# Patient Record
Sex: MALE | Race: WHITE | Employment: FULL TIME | URBAN - METROPOLITAN AREA
[De-identification: names, ages, dates, MRNs, and addresses within clinical notes are randomized per-mention and may not be internally consistent; named-entity substitution may affect disease eponyms.]

---

## 2019-10-08 ENCOUNTER — OFFICE VISIT (OUTPATIENT)
Dept: CHIROPRACTIC MEDICINE | Age: 34
End: 2019-10-08
Payer: COMMERCIAL

## 2019-10-08 VITALS — HEART RATE: 93 BPM | SYSTOLIC BLOOD PRESSURE: 120 MMHG | DIASTOLIC BLOOD PRESSURE: 82 MMHG | OXYGEN SATURATION: 97 %

## 2019-10-08 DIAGNOSIS — M54.41 ACUTE RIGHT-SIDED LOW BACK PAIN WITH RIGHT-SIDED SCIATICA: Primary | ICD-10-CM

## 2019-10-08 PROCEDURE — 98940 CHIROPRACT MANJ 1-2 REGIONS: CPT | Performed by: CHIROPRACTOR

## 2019-10-08 PROCEDURE — 4004F PT TOBACCO SCREEN RCVD TLK: CPT | Performed by: CHIROPRACTOR

## 2019-10-08 PROCEDURE — G8428 CUR MEDS NOT DOCUMENT: HCPCS | Performed by: CHIROPRACTOR

## 2019-10-08 PROCEDURE — 99203 OFFICE O/P NEW LOW 30 MIN: CPT | Performed by: CHIROPRACTOR

## 2019-10-08 PROCEDURE — G8421 BMI NOT CALCULATED: HCPCS | Performed by: CHIROPRACTOR

## 2019-10-08 PROCEDURE — 97014 ELECTRIC STIMULATION THERAPY: CPT | Performed by: CHIROPRACTOR

## 2019-10-08 PROCEDURE — G8484 FLU IMMUNIZE NO ADMIN: HCPCS | Performed by: CHIROPRACTOR

## 2019-10-08 ASSESSMENT — ENCOUNTER SYMPTOMS: BACK PAIN: 1

## 2019-10-15 ENCOUNTER — OFFICE VISIT (OUTPATIENT)
Dept: CHIROPRACTIC MEDICINE | Age: 34
End: 2019-10-15
Payer: COMMERCIAL

## 2019-10-15 DIAGNOSIS — M54.41 ACUTE RIGHT-SIDED LOW BACK PAIN WITH RIGHT-SIDED SCIATICA: Primary | ICD-10-CM

## 2019-10-15 PROCEDURE — 98940 CHIROPRACT MANJ 1-2 REGIONS: CPT | Performed by: CHIROPRACTOR

## 2019-10-15 PROCEDURE — 97014 ELECTRIC STIMULATION THERAPY: CPT | Performed by: CHIROPRACTOR

## 2022-06-17 ENCOUNTER — HOSPITAL ENCOUNTER (EMERGENCY)
Age: 37
Discharge: HOME | End: 2022-06-17
Payer: COMMERCIAL

## 2022-06-17 VITALS
TEMPERATURE: 98.5 F | HEIGHT: 77 IN | SYSTOLIC BLOOD PRESSURE: 138 MMHG | HEART RATE: 87 BPM | WEIGHT: 315 LBS | OXYGEN SATURATION: 96 % | RESPIRATION RATE: 16 BRPM | BODY MASS INDEX: 37.19 KG/M2 | DIASTOLIC BLOOD PRESSURE: 90 MMHG

## 2022-06-17 DIAGNOSIS — H16.002 CORNEAL ULCER OF LEFT EYE: Primary | ICD-10-CM

## 2022-06-17 PROCEDURE — 99283 EMERGENCY DEPT VISIT LOW MDM: CPT

## 2022-06-17 RX ORDER — TOBRAMYCIN 3 MG/ML
2 SOLUTION/ DROPS OPHTHALMIC EVERY 4 HOURS
Qty: 5 ML | Refills: 0 | Status: SHIPPED | OUTPATIENT
Start: 2022-06-17 | End: 2022-06-24

## 2022-06-17 ASSESSMENT — PAIN DESCRIPTION - ORIENTATION: ORIENTATION: LEFT

## 2022-06-17 ASSESSMENT — PAIN - FUNCTIONAL ASSESSMENT: PAIN_FUNCTIONAL_ASSESSMENT: 0-10

## 2022-06-17 ASSESSMENT — PAIN DESCRIPTION - LOCATION: LOCATION: EYE

## 2022-06-17 ASSESSMENT — VISUAL ACUITY: OU: 70/20

## 2022-06-17 ASSESSMENT — PAIN SCALES - GENERAL: PAINLEVEL_OUTOF10: 7

## 2022-06-17 NOTE — ED NOTES
Discharge instructions given, medications and follow up instructions reviewed. Patient verbalized understanding, no other noted or stated problems at this time. Patient will follow up with physicians as directed.         Alice Rivera RN  06/17/22 [id number]

## 2022-06-17 NOTE — ED PROVIDER NOTES
[facility]  Department of Emergency Medicine   ED  Encounter Note  Admit Date/RoomTime: 2022  8:41 AM  ED Room:     NAME: Greta Faith  : 1985  MRN: [id number]     Chief Complaint:  Eye Pain (left eye pain, irritation, and pressure since 0300)    History of Present Illness        Greta Faith is a 39 y.o. old male presenting to the emergency department by private vehicle, for sudden onset, persistent foreign body sensation, pain, photophobia and tearing to left eye, which began 1 day(s) prior to arrival.  Patient states he wears daily contacts which are removed at night, cleansed and then reused for a period of 30 days and then thrown away. He does have glasses. He noted that his eye became painful the other day, removed his contacts thinking that there was something on it and has not had contacts and ever since and complains of persistent pain still. Circumstances:    [x]  Contact Lens Use     []  Recent URI Sx's     []  Spontaneous Onset     []  Close Contact w/similar Sx's     []  Work Related     History of:     []   Glaucoma     []   Recent Eye Surgery     ROS   Pertinent positives and negatives are stated within HPI, all other systems reviewed and are negative. Past Medical History:  has no past medical history on file. Surgical History:  has a past surgical history that includes Tonsillectomy. Social History:  reports that he has never smoked. His smokeless tobacco use includes chew. He reports that he does not drink alcohol and does not use drugs. Family History: family history is not on file. Allergies: Patient has no known allergies.     Physical Exam   Oxygen Saturation Interpretation: Normal.        ED Triage Vitals [22 0839]   BP Temp Temp Source Heart Rate Resp SpO2 Height Weight   (!) 138/90 98.5 °F (36.9 °C) Oral 87 16 96 % 6' 5\" (1.956 m) (!) 350 lb (158.8 kg)         Constitutional:  Alert, development consistent with age. HENT:  NC/NT. Airway patent. Neck:  Normal ROM. Supple. Eyes:         Pupils: equal, round, reactive to light and accommodation. Eyelids: Bilateral upper and lower Swelling/redness:  no swelling or erythema. Conjunctiva: Left injected(red). Sclera: Bilateral normal appearing. Cornea: Left a less than 1 mm foreign body with the appearance of metal centrally. EOM:  Intact Bilaterally. Fundoscopic:  not well visualized. Visual Acuity:  Glasses. Integument:  No rashes, erythema present, unless noted elsewhere. Lymphatics: No lymphangitis or adenopathy noted. Neurological:  Oriented. Motor functions intact. Lab / Imaging Results   (All laboratory and radiology results have been personally reviewed by myself)  Labs:  No results found for this visit on 06/17/22. Imaging: All Radiology results interpreted by Radiologist unless otherwise noted. No orders to display       ED Course / Medical Decision Making     Medications   fluorescein 0.6 MG ophthalmic strip (has no administration in time range)        Re-examination:  6/17/22       Time: 0945  Patients condition is improving after treatment. Consult(s):   None    Procedure(s):  SLIT LAMP EXAM:  Performed By: KATHERINE Miller. Left Eye: Cornea: a small early corneal ulcer was observed just off. Flourescein stain: Positive. Anterior chamber: normal.         Plan of Care/Counseling:  KATHERINE Miller reviewed today's visit with the patient in addition to providing specific details for the plan of care and counseling regarding the diagnosis and prognosis. Examination reveals no obvious retained foreign body although at first glance that is how it appeared. Under magnification this is definitely an early ulceration of the cornea. Patient was encouraged not to wear any contact lenses for period of at least 1 week.   Use the eyedrops as directed and he will be provided an ophthalmology follow-up if needed. Questions are answered at this time and are agreeable with the plan. Assessment      1. Corneal ulcer of left eye      Plan   Discharged home. Patient condition is good    New Medications     New Prescriptions    TOBRAMYCIN (TOBREX) 0.3 % OPHTHALMIC SOLUTION    Place 2 drops into the left eye every 4 hours for 7 days     Electronically signed by KATHERINE Braden   DD: 6/17/22  **This report was transcribed using voice recognition software. Every effort was made to ensure accuracy; however, inadvertent computerized transcription errors may be present.   [facility] OF ED PROVIDER NOTE       Merlin Graver, [address]  06/17/22 [id number]

## 2023-02-22 LAB
ALBUMIN SERPL-MCNC: 4.5 G/DL
ALP BLD-CCNC: 92 U/L
ALT SERPL-CCNC: 40 U/L
ANION GAP SERPL CALCULATED.3IONS-SCNC: 12 MMOL/L
AST SERPL-CCNC: 22 U/L
BILIRUB SERPL-MCNC: 0.6 MG/DL (ref 0.1–1.4)
BUN BLDV-MCNC: 19 MG/DL
CALCIUM SERPL-MCNC: 9.1 MG/DL
CHLORIDE BLD-SCNC: 104 MMOL/L
CHOLESTEROL, TOTAL: 16 MG/DL
CHOLESTEROL/HDL RATIO: 3.1
CO2: 23 MMOL/L
CREAT SERPL-MCNC: 0.99 MG/DL
EGFR: 100
GLUCOSE BLD-MCNC: 111 MG/DL
HDLC SERPL-MCNC: 39 MG/DL (ref 35–70)
LDL CHOLESTEROL CALCULATED: 119 MG/DL (ref 0–160)
NONHDLC SERPL-MCNC: 4.5 MG/DL
POTASSIUM SERPL-SCNC: 4.5 MMOL/L
SODIUM BLD-SCNC: 139 MMOL/L
TOTAL PROTEIN: 7.1
TRIGL SERPL-MCNC: 92 MG/DL
VLDLC SERPL CALC-MCNC: 18 MG/DL

## 2023-07-21 ENCOUNTER — OFFICE VISIT (OUTPATIENT)
Dept: NEUROLOGY | Age: 38
End: 2023-07-21
Payer: COMMERCIAL

## 2023-07-21 VITALS
HEIGHT: 77 IN | TEMPERATURE: 97.6 F | HEART RATE: 86 BPM | OXYGEN SATURATION: 97 % | BODY MASS INDEX: 37.19 KG/M2 | WEIGHT: 315 LBS | DIASTOLIC BLOOD PRESSURE: 81 MMHG | SYSTOLIC BLOOD PRESSURE: 130 MMHG

## 2023-07-21 DIAGNOSIS — M79.602 PAIN IN BOTH UPPER EXTREMITIES: ICD-10-CM

## 2023-07-21 DIAGNOSIS — M15.8 OTHER OSTEOARTHRITIS INVOLVING MULTIPLE JOINTS: ICD-10-CM

## 2023-07-21 DIAGNOSIS — R20.0 NUMBNESS AND TINGLING OF BOTH UPPER EXTREMITIES: ICD-10-CM

## 2023-07-21 DIAGNOSIS — R20.2 PARESTHESIAS: Primary | ICD-10-CM

## 2023-07-21 DIAGNOSIS — M79.601 PAIN IN BOTH UPPER EXTREMITIES: ICD-10-CM

## 2023-07-21 DIAGNOSIS — R20.2 NUMBNESS AND TINGLING OF BOTH UPPER EXTREMITIES: ICD-10-CM

## 2023-07-21 PROCEDURE — 4004F PT TOBACCO SCREEN RCVD TLK: CPT | Performed by: NURSE PRACTITIONER

## 2023-07-21 PROCEDURE — 99204 OFFICE O/P NEW MOD 45 MIN: CPT | Performed by: NURSE PRACTITIONER

## 2023-07-21 PROCEDURE — G8417 CALC BMI ABV UP PARAM F/U: HCPCS | Performed by: NURSE PRACTITIONER

## 2023-07-21 PROCEDURE — G8427 DOCREV CUR MEDS BY ELIG CLIN: HCPCS | Performed by: NURSE PRACTITIONER

## 2023-07-21 RX ORDER — GABAPENTIN 300 MG/1
300 CAPSULE ORAL 3 TIMES DAILY
Qty: 90 CAPSULE | Refills: 0 | Status: SHIPPED | OUTPATIENT
Start: 2023-07-21 | End: 2023-08-20

## 2023-07-21 NOTE — PROGRESS NOTES
[address] Jyoti Ashley M.D., F.A.C.P. Yeison Malone, JAMARCUS, APRN, CNS  Dorian Setting. Mechelle Hauser, MSN, APRN-FNP-C  Alex Carlin MSN, APRN, FNP-C  Patti Bumpers MSPAS, PA-C  [address] [facility] MSN, APRN, FNP-C  Naeem Foote, MSN, APRN, FNP-BC  [address]  Phone: [phone]  Fax: [phone]       Lana Pappas is a 44 y.o. right handed male     Patient presents to neurology clinic today for paresthesias, numbness and tingling    Patient presents alone and is deemed a good historian    Past Medical History:     Past Medical History:   Diagnosis Date    Arthritis     Gout        Past Surgical History:       Past Surgical History:   Procedure Laterality Date    TONSILLECTOMY         Allergies:       Patient has no known allergies. Medications:     Prior to Admission medications    Medication Sig Start Date End Date Taking? Authorizing Provider   gabapentin (NEURONTIN) 300 MG capsule Take 1 capsule by mouth 3 times daily for 30 days. 7/21/23 8/20/23 Yes YUSRA Devlin - KETTY   predniSONE (DELTASONE) 20 MG tablet Take 20 mg by mouth daily  Patient not taking: Reported on 7/21/2023    Historical Provider, MD   allopurinol (ZYLOPRIM) 100 MG tablet Take 100 mg by mouth daily  Patient not taking: Reported on 7/21/2023    Historical Provider, MD   hydrocortisone 1 % cream Apply topically 2 times daily Apply topically 2 times daily. Patient not taking: Reported on 7/21/2023    Historical Provider, MD   Multiple Vitamins TABS Take 1 tablet by mouth daily  Patient not taking: Reported on 7/21/2023    Historical Provider, MD       Social History:        reports that he has never smoked. His smokeless tobacco use includes chew. He reports that he does not drink alcohol and does not use drugs. Patient is  and has 2 daughters as well as custody of his nephew. Patient works as a  here locally.     Family History:     No

## 2023-09-05 ENCOUNTER — HOSPITAL ENCOUNTER (OUTPATIENT)
Dept: MRI IMAGING | Age: 38
Discharge: HOME | End: 2023-09-07
Payer: COMMERCIAL

## 2023-09-05 DIAGNOSIS — R20.2 PARESTHESIAS: ICD-10-CM

## 2023-09-05 PROCEDURE — 6360000004 HC RX CONTRAST MEDICATION: Performed by: RADIOLOGY

## 2023-09-05 PROCEDURE — A9579 GAD-BASE MR CONTRAST NOS,1ML: HCPCS | Performed by: RADIOLOGY

## 2023-09-05 PROCEDURE — 72156 MRI NECK SPINE W/O & W/DYE: CPT

## 2023-09-05 RX ADMIN — GADOTERIDOL 20 ML: 279.3 INJECTION, SOLUTION INTRAVENOUS at 18:40

## 2023-09-19 DIAGNOSIS — R20.2 PARESTHESIAS: ICD-10-CM

## 2023-09-19 LAB
ABSOLUTE IMMATURE GRANULOCYTE: <0.03 K/UL (ref 0–0.58)
BASOPHILS ABSOLUTE: 0.06 K/UL (ref 0–0.2)
BASOPHILS RELATIVE PERCENT: 1 % (ref 0–2)
EOSINOPHILS ABSOLUTE: 0.05 K/UL (ref 0.05–0.5)
EOSINOPHILS RELATIVE PERCENT: 1 % (ref 0–6)
HCT VFR BLD CALC: 45 % (ref 37–54)
HEMOGLOBIN: 14.8 G/DL (ref 12.5–16.5)
IMMATURE GRANULOCYTES: 0 % (ref 0–5)
LYMPHOCYTES ABSOLUTE: 1.93 K/UL (ref 1.5–4)
LYMPHOCYTES RELATIVE PERCENT: 34 % (ref 20–42)
MCH RBC QN AUTO: 29.8 PG (ref 26–35)
MCHC RBC AUTO-ENTMCNC: 32.9 G/DL (ref 32–34.5)
MCV RBC AUTO: 90.7 FL (ref 80–99.9)
MONOCYTES ABSOLUTE: 0.48 K/UL (ref 0.1–0.95)
MONOCYTES RELATIVE PERCENT: 9 % (ref 2–12)
NEUTROPHILS ABSOLUTE: 3.1 K/UL (ref 1.8–7.3)
NEUTROPHILS RELATIVE PERCENT: 55 % (ref 43–80)
PDW BLD-RTO: 13.2 % (ref 11.5–15)
PLATELET # BLD: 223 K/UL (ref 130–450)
PMV BLD AUTO: 10.7 FL (ref 7–12)
RBC # BLD: 4.96 M/UL (ref 3.8–5.8)
WBC # BLD: 5.6 K/UL (ref 4.5–11.5)

## 2023-09-20 LAB
ALBUMIN (CALCULATED): 3.8 G/DL (ref 3.5–4.7)
ALPHA-1-GLOBULIN: 0.3 G/DL (ref 0.2–0.4)
ALPHA-2-GLOBULIN: 0.6 G/DL (ref 0.5–1)
BETA GLOBULIN: 1.1 G/DL (ref 0.8–1.3)
FERRITIN: 575 NG/ML
FOLATE: 12.4 NG/ML (ref 4.8–24.2)
GAMMA GLOBULIN: 1.3 G/DL (ref 0.7–1.6)
IRON SATURATION: 24 % (ref 20–55)
IRON: 68 UG/DL (ref 59–158)
PATHOLOGIST: NORMAL
PROTEIN ELECTROPHORESIS, SERUM: NORMAL
TOTAL IRON BINDING CAPACITY: 284 UG/DL (ref 250–450)
TOTAL PROTEIN: 7.1 G/DL (ref 6.4–8.3)
TSH SERPL DL<=0.05 MIU/L-ACNC: 1.59 UIU/ML (ref 0.27–4.2)
VITAMIN B-12: 585 PG/ML (ref 211–946)
VITAMIN D 25-HYDROXY: 21.8 NG/ML (ref 30–100)

## 2023-09-24 LAB — VITAMIN B1 WHOLE BLOOD: 125 NMOL/L (ref 70–180)

## 2023-09-25 LAB — VITAMIN B6: 51.3 NMOL/L (ref 20–125)

## 2023-09-29 ENCOUNTER — TELEPHONE (OUTPATIENT)
Dept: NEUROLOGY | Age: 38
End: 2023-09-29

## 2023-09-29 NOTE — TELEPHONE ENCOUNTER
----- Message from YUSRA Gaming NP sent at 9/25/2023  1:48 PM EDT -----  Please notify patient that their lab results were normal with the exception of a low vitamin D.  Vitamin D supplementation should be started.

## 2023-09-29 NOTE — TELEPHONE ENCOUNTER
----- Message from YUSRA Long NP sent at 9/8/2023  1:50 PM EDT -----  Please notify patient that their MRI cervical spine results are normal.

## 2024-01-22 ENCOUNTER — OFFICE VISIT (OUTPATIENT)
Dept: NEUROLOGY | Age: 39
End: 2024-01-22
Payer: COMMERCIAL

## 2024-01-22 VITALS
SYSTOLIC BLOOD PRESSURE: 139 MMHG | OXYGEN SATURATION: 99 % | TEMPERATURE: 98.8 F | WEIGHT: 315 LBS | HEART RATE: 80 BPM | BODY MASS INDEX: 37.19 KG/M2 | HEIGHT: 77 IN | DIASTOLIC BLOOD PRESSURE: 88 MMHG

## 2024-01-22 DIAGNOSIS — M62.838 MUSCLE SPASM: ICD-10-CM

## 2024-01-22 DIAGNOSIS — R20.0 NUMBNESS AND TINGLING OF BOTH UPPER EXTREMITIES: ICD-10-CM

## 2024-01-22 DIAGNOSIS — E55.9 VITAMIN D DEFICIENCY: ICD-10-CM

## 2024-01-22 DIAGNOSIS — R20.2 NUMBNESS AND TINGLING OF BOTH UPPER EXTREMITIES: ICD-10-CM

## 2024-01-22 DIAGNOSIS — R20.2 PARESTHESIAS: Primary | ICD-10-CM

## 2024-01-22 PROCEDURE — G8484 FLU IMMUNIZE NO ADMIN: HCPCS | Performed by: NURSE PRACTITIONER

## 2024-01-22 PROCEDURE — 4004F PT TOBACCO SCREEN RCVD TLK: CPT | Performed by: NURSE PRACTITIONER

## 2024-01-22 PROCEDURE — 99213 OFFICE O/P EST LOW 20 MIN: CPT | Performed by: NURSE PRACTITIONER

## 2024-01-22 PROCEDURE — G8417 CALC BMI ABV UP PARAM F/U: HCPCS | Performed by: NURSE PRACTITIONER

## 2024-01-22 PROCEDURE — G8427 DOCREV CUR MEDS BY ELIG CLIN: HCPCS | Performed by: NURSE PRACTITIONER

## 2024-01-22 RX ORDER — CHOLECALCIFEROL (VITAMIN D3) 50 MCG
2000 TABLET ORAL DAILY
Qty: 90 TABLET | Refills: 0 | Status: SHIPPED | OUTPATIENT
Start: 2024-01-22 | End: 2024-04-21

## 2024-01-22 RX ORDER — TIZANIDINE 4 MG/1
4 TABLET ORAL NIGHTLY
Qty: 30 TABLET | Refills: 0 | Status: SHIPPED | OUTPATIENT
Start: 2024-01-22 | End: 2024-02-21

## 2024-01-22 NOTE — PROGRESS NOTES
facial muscle function - upper  Normal   VII: facial muscle function - lower Normal   VIII: hearing Normal   IX: soft palate elevation  Normal   IX,X: gag reflex    XI: trapezius strength  5/5   XI: sternocleidomastoid strength 5/5   XI: neck extension strength  5/5   XII: tongue strength  Normal     Motor:  5/5 bilateral handgrips  5/5 throughout  Normal bulk and tone  No drift   No abnormal movements    Sensory:  LT and PP normal  Vibration normal    (-) Tinel's and Phalen's again today     Coordination:   FN, FFM and MASSIEL normal  HS normal    Gait:  Normal  Walks well on toes, heels, and tandem    DTR:   Right Brachioradialis reflex 0  Left Brachioradialis reflex 0  Right Biceps reflex 0  Left Biceps reflex 0  Right Triceps reflex 0  Left Triceps reflex 0  Right Quadriceps reflex 0  Left Quadriceps reflex 0  Right Achilles reflex 0  Left Achilles reflex 0    ?L Babinskis  No Reyes's    No other pathological reflexes    Laboratory/Radiology:     Lab Results   Component Value Date    WBC 5.6 09/19/2023    HGB 14.8 09/19/2023    HCT 45.0 09/19/2023    MCV 90.7 09/19/2023     09/19/2023    LYMPHOPCT 34 09/19/2023    RBC 4.96 09/19/2023    MCH 29.8 09/19/2023    MCHC 32.9 09/19/2023    RDW 13.2 09/19/2023     Lab Results   Component Value Date     02/22/2023    K 4.5 02/22/2023     02/22/2023    CO2 23 02/22/2023    BUN 19 02/22/2023    CREATININE 0.99 02/22/2023    GLUCOSE 111 02/22/2023    CALCIUM 9.1 02/22/2023    PROT 7.1 09/19/2023    LABALBU 4.5 02/22/2023    BILITOT 0.6 02/22/2023    ALKPHOS 92 02/22/2023    AST 22 02/22/2023    ALT 40 02/22/2023    LABGLOM 100 02/22/2023     Lab Results   Component Value Date    TSH 1.59 09/19/2023      Lab Results   Component Value Date    RUHDITBK35 585 09/19/2023      Lab Results   Component Value Date    FOLATE 12.4 09/19/2023      MRI cervical spine with and without contrast 9/6/2023:  1. Mild degenerative change.  Mild bilateral C3-4 and left C4-5

## 2025-05-29 ENCOUNTER — HOSPITAL ENCOUNTER (OUTPATIENT)
Dept: GENERAL RADIOLOGY | Age: 40
Discharge: HOME | End: 2025-05-31
Payer: COMMERCIAL

## 2025-05-29 ENCOUNTER — TRANSCRIBE ORDERS (OUTPATIENT)
Dept: GENERAL RADIOLOGY | Age: 40
End: 2025-05-29

## 2025-05-29 DIAGNOSIS — R05.3 CHRONIC COUGH: Primary | ICD-10-CM

## 2025-05-29 DIAGNOSIS — R05.3 CHRONIC COUGH: ICD-10-CM

## 2025-05-29 PROCEDURE — 71046 X-RAY EXAM CHEST 2 VIEWS: CPT
